# Patient Record
Sex: FEMALE | Race: WHITE | NOT HISPANIC OR LATINO | Employment: FULL TIME | URBAN - METROPOLITAN AREA
[De-identification: names, ages, dates, MRNs, and addresses within clinical notes are randomized per-mention and may not be internally consistent; named-entity substitution may affect disease eponyms.]

---

## 2020-08-10 PROBLEM — N92.1 MENORRHAGIA WITH IRREGULAR CYCLE: Status: ACTIVE | Noted: 2020-08-10

## 2020-08-10 NOTE — H&P (VIEW-ONLY)
Subjective:       Patient ID: Sarah Leach is a 39 y.o. female.    Chief Complaint:  Pre-op Exam      History of Present Illness  39-year-old  0-3 here today for a preoperative appointment.  Patient desires definitive treatment for her menorrhagia with D&C hysteroscopy NovaSure procedure.  Pathology was reviewed.  Ultrasound was reviewed.  Both noted to be within normal limits.  Patient was started on Lexapro.  She reports significant improvement in her mood.  She still complains of the problems with her bowels when having issues with anxiety.  She has weaned off of the Ativan to a dose of 0.5 mg p.o. q.6 hours.  We discussed a GI consultation.  Patient reports that the Levsin did not improve her symptoms.  Consent signed on chart.  Risks and benefits explained.  All questions were answered.      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, antepartum     Obesity complicating pregnancy      Past Surgical History:   Procedure Laterality Date     SECTION      CHOLECYSTECTOMY      SALPINGECTOMY      Right sided for ectopic pregnancy    TUBAL LIGATION       Social History     Tobacco Use    Smoking status: Former Smoker    Smokeless tobacco: Never Used   Substance Use Topics    Alcohol use: No    Drug use: Never          Current Outpatient Medications:     amLODIPine (NORVASC) 5 MG tablet, Take 5 mg by mouth once daily., Disp: , Rfl:     ergocalciferol (VITAMIN D2) 50,000 unit Cap, Take 1 capsule (50,000 Units total) by mouth every 7 days. for 12 doses, Disp: 12 capsule, Rfl: 0    escitalopram oxalate (LEXAPRO) 10 MG tablet, Take 1 tablet (10 mg total) by mouth once daily., Disp: 30 tablet, Rfl: 2    hydroCHLOROthiazide (HYDRODIURIL) 12.5 MG Tab, Take 12.5 mg by mouth once daily., Disp: , Rfl:     hyoscyamine (ANASPAZ,LEVSIN) 0.125 mg Tab, Take 1 tablet (125 mcg total) by mouth every 4  (four) hours as needed., Disp: 90 tablet, Rfl: 3    ketoconazole (NIZORAL) 2 % cream, Apply to affected area daily x 6 weeks, Disp: 30 g, Rfl: 1    LORazepam (ATIVAN) 2 MG Tab, Take 0.5 mg by mouth every 6 (six) hours as needed., Disp: , Rfl:     losartan (COZAAR) 50 MG tablet, Take 50 mg by mouth once daily., Disp: , Rfl:     metFORMIN (GLUCOPHAGE) 500 MG tablet, Take 500mg po BID x 1 week, then 1000mg po q hs and 500mg po q am x 1 week, then 1000mg PO BID, Disp: 120 tablet, Rfl: 11    rosuvastatin (CRESTOR) 5 MG tablet, Take 1 tablet (5 mg total) by mouth nightly., Disp: 30 tablet, Rfl: 11   Review of patient's allergies indicates:  No Known Allergies     GYN & OB History  No LMP recorded. (Menstrual status: Other).   Date of Last Pap: 7/15/2020    OB History    Para Term  AB Living   5 2 2   2 3   SAB TAB Ectopic Multiple Live Births   1   1   3      # Outcome Date GA Lbr Kingsley/2nd Weight Sex Delivery Anes PTL Lv   5  13        SIOBHAN   4 Ectopic 2012              Birth Comments: Required 3units PRBCs and right salpingectomy   3 Term  39w0d  3.997 kg (8 lb 13 oz) M CS-LTranv Spinal  SIOBHAN   2 SAB 2007           1 Term  39w0d  4.082 kg (9 lb) M CS-LTranv EPI  SIOBHAN       Review of Systems  Review of Systems   Constitutional: Negative for activity change, fatigue and unexpected weight change.   HENT: Negative for nasal congestion and tinnitus.    Eyes: Negative for discharge and visual disturbance.   Respiratory: Negative for cough, shortness of breath and wheezing.    Cardiovascular: Negative for chest pain, palpitations and leg swelling.   Gastrointestinal: Positive for abdominal pain and diarrhea. Negative for bloating, blood in stool, constipation, nausea, vomiting and reflux.   Endocrine: Negative for diabetes, hair loss, hot flashes, hyperthyroidism and hypothyroidism.   Genitourinary: Positive for dysmenorrhea, menorrhagia and menstrual problem. Negative for bladder  incontinence, decreased libido, dyspareunia, dysuria, frequency, genital sores, hematuria, hot flashes, pelvic pain, urgency, vaginal discharge, vaginal pain, urinary incontinence, postcoital bleeding and vaginal odor.   Musculoskeletal: Negative for arthralgias, joint swelling and myalgias.   Integumentary:  Negative for rash, acne, hair changes, mole/lesion, breast mass, nipple discharge, breast skin changes and breast tenderness.   Neurological: Negative for vertigo, seizures, syncope, numbness and headaches.   Hematological: Negative for adenopathy. Does not bruise/bleed easily.   Psychiatric/Behavioral: Negative for depression and sleep disturbance. The patient is nervous/anxious.    Breast: Negative for asymmetry, breast self exam, lump, mass, mastodynia, nipple discharge, skin changes and tenderness          Objective:    Physical Exam:   Constitutional: She is oriented to person, place, and time. She appears well-developed and well-nourished.    HENT:   Head: Normocephalic and atraumatic.   Nose: No epistaxis.    Eyes: EOM are normal.    Neck: Normal range of motion and full passive range of motion without pain. Neck supple.    Cardiovascular: Normal rate, regular rhythm and normal heart sounds.     Pulmonary/Chest: Effort normal and breath sounds normal.        Abdominal: Soft. Bowel sounds are normal.     Genitourinary:    Vagina and uterus normal.   There is no lesion on the right labia. There is no lesion on the left labia. Cervix is normal. Right adnexum displays no mass, no tenderness and no fullness. Left adnexum displays no mass, no tenderness and no fullness. Vaginal cuff normal.Cervix exhibits no motion tenderness.           Musculoskeletal: Normal range of motion.       Neurological: She is alert and oriented to person, place, and time. She has normal strength.    Skin: Skin is warm and dry.    Psychiatric: She has a normal mood and affect. Her speech is normal and behavior is normal.           Assessment:        1. Menorrhagia with irregular cycle    2. Preop testing               Plan:      Menorrhagia with irregular cycle  -     Case Request Operating Room: EXCISION, LEIOMYOMA, UTERUS, WITH DILATION AND CURETTAGE OF UTERUS, HYSTEROSCOPY, WITH DILATION AND CURETTAGE OF UTERUS  -     Pregnancy, urine rapid  -     X-Ray Chest 1 View Pre-OP; Future; Expected date: 08/10/2020  -     EKG 12-lead; Future    Preop testing  -     Pregnancy, urine rapid  -     X-Ray Chest 1 View Pre-OP; Future; Expected date: 08/10/2020  -     EKG 12-lead; Future  -     COVID-19 Routine Screening; Future; Expected date: 08/10/2020          Follow up in 2 days (on 8/12/2020) for D&C Hscope GTA.

## 2020-08-11 NOTE — PRE-PROCEDURE INSTRUCTIONS
1410- PT CALLED BACK . CONFUSED ABOUT TIME.OT DID NOT WANT TO COME TODAY AND DO EKG OR CXR. EKG AND CXR SCHEDULED FOR 0800 IN AM. SURGERY CHECK IN FOR 0900 TOMORROW. DISCUSSED PROCEDURE. COVID RAPID WILL BE DONE IN AM.

## 2020-08-12 ENCOUNTER — ANESTHESIA EVENT (OUTPATIENT)
Dept: SURGERY | Facility: HOSPITAL | Age: 40
End: 2020-08-12
Payer: COMMERCIAL

## 2020-08-12 ENCOUNTER — ANESTHESIA (OUTPATIENT)
Dept: SURGERY | Facility: HOSPITAL | Age: 40
End: 2020-08-12
Payer: COMMERCIAL

## 2020-08-12 ENCOUNTER — HOSPITAL ENCOUNTER (OUTPATIENT)
Dept: RADIOLOGY | Facility: HOSPITAL | Age: 40
Discharge: HOME | End: 2020-08-12
Attending: OBSTETRICS & GYNECOLOGY | Admitting: OBSTETRICS & GYNECOLOGY
Payer: COMMERCIAL

## 2020-08-12 ENCOUNTER — HOSPITAL ENCOUNTER (OUTPATIENT)
Dept: CARDIOLOGY | Facility: HOSPITAL | Age: 40
Discharge: HOME | End: 2020-08-12
Attending: OBSTETRICS & GYNECOLOGY | Admitting: OBSTETRICS & GYNECOLOGY
Payer: COMMERCIAL

## 2020-08-12 ENCOUNTER — HOSPITAL ENCOUNTER (OUTPATIENT)
Facility: HOSPITAL | Age: 40
Discharge: HOME | End: 2020-08-12
Attending: OBSTETRICS & GYNECOLOGY | Admitting: OBSTETRICS & GYNECOLOGY
Payer: COMMERCIAL

## 2020-08-12 DIAGNOSIS — N92.1 MENORRHAGIA WITH IRREGULAR CYCLE: ICD-10-CM

## 2020-08-12 DIAGNOSIS — N84.0 ENDOMETRIAL POLYP: Primary | ICD-10-CM

## 2020-08-12 DIAGNOSIS — E66.01 MORBID OBESITY: ICD-10-CM

## 2020-08-12 DIAGNOSIS — Z01.818 PREOP TESTING: ICD-10-CM

## 2020-08-12 LAB
POCT GLUCOSE: 107 MG/DL (ref 70–110)
SARS-COV-2 RDRP RESP QL NAA+PROBE: NEGATIVE

## 2020-08-12 PROCEDURE — 99900103 DSU ONLY-NO CHARGE-INITIAL HR (STAT)

## 2020-08-12 PROCEDURE — D9220A PRA ANESTHESIA: ICD-10-PCS | Mod: ,,, | Performed by: ANESTHESIOLOGY

## 2020-08-12 PROCEDURE — 63600175 PHARM REV CODE 636 W HCPCS: Performed by: NURSE ANESTHETIST, CERTIFIED REGISTERED

## 2020-08-12 PROCEDURE — 25000003 PHARM REV CODE 250

## 2020-08-12 PROCEDURE — 93005 ELECTROCARDIOGRAM TRACING: CPT

## 2020-08-12 PROCEDURE — 36000706: Performed by: OBSTETRICS & GYNECOLOGY

## 2020-08-12 PROCEDURE — 88305 TISSUE EXAM BY PATHOLOGIST: CPT | Performed by: PATHOLOGY

## 2020-08-12 PROCEDURE — U0002 COVID-19 LAB TEST NON-CDC: HCPCS

## 2020-08-12 PROCEDURE — 71045 XR CHEST 1 VIEW PRE-OP: ICD-10-PCS | Mod: 26,,, | Performed by: RADIOLOGY

## 2020-08-12 PROCEDURE — 25000003 PHARM REV CODE 250: Performed by: OBSTETRICS & GYNECOLOGY

## 2020-08-12 PROCEDURE — 71000015 HC POSTOP RECOV 1ST HR: Performed by: OBSTETRICS & GYNECOLOGY

## 2020-08-12 PROCEDURE — 63600175 PHARM REV CODE 636 W HCPCS: Performed by: ANESTHESIOLOGY

## 2020-08-12 PROCEDURE — 36000707: Performed by: OBSTETRICS & GYNECOLOGY

## 2020-08-12 PROCEDURE — 71045 X-RAY EXAM CHEST 1 VIEW: CPT | Mod: 26,,, | Performed by: RADIOLOGY

## 2020-08-12 PROCEDURE — 37000009 HC ANESTHESIA EA ADD 15 MINS: Performed by: OBSTETRICS & GYNECOLOGY

## 2020-08-12 PROCEDURE — 71000033 HC RECOVERY, INTIAL HOUR: Performed by: OBSTETRICS & GYNECOLOGY

## 2020-08-12 PROCEDURE — 37000008 HC ANESTHESIA 1ST 15 MINUTES: Performed by: OBSTETRICS & GYNECOLOGY

## 2020-08-12 PROCEDURE — 71045 X-RAY EXAM CHEST 1 VIEW: CPT | Mod: TC,FY

## 2020-08-12 PROCEDURE — D9220A PRA ANESTHESIA: Mod: ,,, | Performed by: ANESTHESIOLOGY

## 2020-08-12 PROCEDURE — S0020 INJECTION, BUPIVICAINE HYDRO: HCPCS | Performed by: OBSTETRICS & GYNECOLOGY

## 2020-08-12 PROCEDURE — 88305 TISSUE EXAM BY PATHOLOGIST: ICD-10-PCS | Mod: 26,,, | Performed by: PATHOLOGY

## 2020-08-12 PROCEDURE — C1782 MORCELLATOR: HCPCS | Performed by: OBSTETRICS & GYNECOLOGY

## 2020-08-12 PROCEDURE — 88305 TISSUE EXAM BY PATHOLOGIST: CPT | Mod: 26,,, | Performed by: PATHOLOGY

## 2020-08-12 PROCEDURE — S0028 INJECTION, FAMOTIDINE, 20 MG: HCPCS

## 2020-08-12 PROCEDURE — 27201423 OPTIME MED/SURG SUP & DEVICES STERILE SUPPLY: Performed by: OBSTETRICS & GYNECOLOGY

## 2020-08-12 RX ORDER — MORPHINE SULFATE 4 MG/ML
2 INJECTION, SOLUTION INTRAMUSCULAR; INTRAVENOUS
Status: CANCELLED | OUTPATIENT
Start: 2020-08-12

## 2020-08-12 RX ORDER — IBUPROFEN 800 MG/1
800 TABLET ORAL EVERY 8 HOURS PRN
Qty: 30 TABLET | Refills: 1 | Status: SHIPPED | OUTPATIENT
Start: 2020-08-12 | End: 2021-09-01

## 2020-08-12 RX ORDER — PROPOFOL 10 MG/ML
VIAL (ML) INTRAVENOUS
Status: DISCONTINUED | OUTPATIENT
Start: 2020-08-12 | End: 2020-08-12

## 2020-08-12 RX ORDER — ONDANSETRON 4 MG/1
8 TABLET, ORALLY DISINTEGRATING ORAL EVERY 8 HOURS PRN
Status: DISCONTINUED | OUTPATIENT
Start: 2020-08-12 | End: 2020-08-12 | Stop reason: HOSPADM

## 2020-08-12 RX ORDER — SODIUM CHLORIDE, SODIUM LACTATE, POTASSIUM CHLORIDE, CALCIUM CHLORIDE 600; 310; 30; 20 MG/100ML; MG/100ML; MG/100ML; MG/100ML
125 INJECTION, SOLUTION INTRAVENOUS CONTINUOUS
Status: DISCONTINUED | OUTPATIENT
Start: 2020-08-12 | End: 2020-08-12 | Stop reason: HOSPADM

## 2020-08-12 RX ORDER — SODIUM CHLORIDE, SODIUM LACTATE, POTASSIUM CHLORIDE, CALCIUM CHLORIDE 600; 310; 30; 20 MG/100ML; MG/100ML; MG/100ML; MG/100ML
INJECTION, SOLUTION INTRAVENOUS CONTINUOUS
Status: CANCELLED | OUTPATIENT
Start: 2020-08-12

## 2020-08-12 RX ORDER — SODIUM CHLORIDE 9 MG/ML
INJECTION, SOLUTION INTRAVENOUS CONTINUOUS
Status: DISCONTINUED | OUTPATIENT
Start: 2020-08-12 | End: 2020-08-12 | Stop reason: HOSPADM

## 2020-08-12 RX ORDER — DOXYCYCLINE HYCLATE 100 MG
100 TABLET ORAL 2 TIMES DAILY
Status: DISCONTINUED | OUTPATIENT
Start: 2020-08-12 | End: 2020-08-12 | Stop reason: HOSPADM

## 2020-08-12 RX ORDER — DIPHENHYDRAMINE HYDROCHLORIDE 50 MG/ML
25 INJECTION INTRAMUSCULAR; INTRAVENOUS EVERY 4 HOURS PRN
Status: CANCELLED | OUTPATIENT
Start: 2020-08-12

## 2020-08-12 RX ORDER — AMOXICILLIN 250 MG
1 CAPSULE ORAL 2 TIMES DAILY
COMMUNITY
Start: 2020-08-12 | End: 2021-09-01

## 2020-08-12 RX ORDER — IBUPROFEN 600 MG/1
600 TABLET ORAL EVERY 6 HOURS PRN
Status: CANCELLED | OUTPATIENT
Start: 2020-08-12

## 2020-08-12 RX ORDER — DOXYCYCLINE HYCLATE 100 MG
200 TABLET ORAL ONCE
Status: CANCELLED | OUTPATIENT
Start: 2020-08-12 | End: 2020-08-12

## 2020-08-12 RX ORDER — MEPERIDINE HYDROCHLORIDE 50 MG/ML
INJECTION INTRAMUSCULAR; INTRAVENOUS; SUBCUTANEOUS
Status: DISCONTINUED | OUTPATIENT
Start: 2020-08-12 | End: 2020-08-12

## 2020-08-12 RX ORDER — MORPHINE SULFATE 4 MG/ML
2 INJECTION, SOLUTION INTRAMUSCULAR; INTRAVENOUS EVERY 5 MIN PRN
Status: DISCONTINUED | OUTPATIENT
Start: 2020-08-12 | End: 2020-08-12 | Stop reason: HOSPADM

## 2020-08-12 RX ORDER — FAMOTIDINE 10 MG/ML
INJECTION INTRAVENOUS
Status: COMPLETED
Start: 2020-08-12 | End: 2020-08-12

## 2020-08-12 RX ORDER — OXYCODONE AND ACETAMINOPHEN 10; 325 MG/1; MG/1
1 TABLET ORAL EVERY 6 HOURS PRN
Qty: 14 TABLET | Refills: 0 | Status: SHIPPED | OUTPATIENT
Start: 2020-08-12 | End: 2021-09-01

## 2020-08-12 RX ORDER — MIDAZOLAM HYDROCHLORIDE 1 MG/ML
INJECTION, SOLUTION INTRAMUSCULAR; INTRAVENOUS
Status: DISCONTINUED | OUTPATIENT
Start: 2020-08-12 | End: 2020-08-12

## 2020-08-12 RX ORDER — DIPHENHYDRAMINE HYDROCHLORIDE 50 MG/ML
12.5 INJECTION INTRAMUSCULAR; INTRAVENOUS
Status: DISCONTINUED | OUTPATIENT
Start: 2020-08-12 | End: 2020-08-12 | Stop reason: HOSPADM

## 2020-08-12 RX ORDER — MORPHINE SULFATE 4 MG/ML
3 INJECTION, SOLUTION INTRAMUSCULAR; INTRAVENOUS
Status: CANCELLED | OUTPATIENT
Start: 2020-08-12

## 2020-08-12 RX ORDER — ONDANSETRON 2 MG/ML
4 INJECTION INTRAMUSCULAR; INTRAVENOUS DAILY PRN
Status: DISCONTINUED | OUTPATIENT
Start: 2020-08-12 | End: 2020-08-12 | Stop reason: HOSPADM

## 2020-08-12 RX ORDER — FAMOTIDINE 10 MG/ML
20 INJECTION INTRAVENOUS ONCE
Status: CANCELLED | OUTPATIENT
Start: 2020-08-12 | End: 2020-08-12

## 2020-08-12 RX ORDER — SODIUM CHLORIDE, SODIUM LACTATE, POTASSIUM CHLORIDE, CALCIUM CHLORIDE 600; 310; 30; 20 MG/100ML; MG/100ML; MG/100ML; MG/100ML
INJECTION, SOLUTION INTRAVENOUS CONTINUOUS PRN
Status: DISCONTINUED | OUTPATIENT
Start: 2020-08-12 | End: 2020-08-12

## 2020-08-12 RX ORDER — LIDOCAINE HYDROCHLORIDE 10 MG/ML
1 INJECTION, SOLUTION EPIDURAL; INFILTRATION; INTRACAUDAL; PERINEURAL ONCE
Status: CANCELLED | OUTPATIENT
Start: 2020-08-12 | End: 2020-08-12

## 2020-08-12 RX ORDER — DIPHENHYDRAMINE HCL 25 MG
25 CAPSULE ORAL EVERY 4 HOURS PRN
Status: CANCELLED | OUTPATIENT
Start: 2020-08-12

## 2020-08-12 RX ORDER — BUPIVACAINE HYDROCHLORIDE 5 MG/ML
INJECTION, SOLUTION EPIDURAL; INTRACAUDAL
Status: DISCONTINUED | OUTPATIENT
Start: 2020-08-12 | End: 2020-08-12 | Stop reason: HOSPADM

## 2020-08-12 RX ORDER — LIDOCAINE HYDROCHLORIDE 10 MG/ML
INJECTION INFILTRATION; PERINEURAL
Status: DISCONTINUED | OUTPATIENT
Start: 2020-08-12 | End: 2020-08-12 | Stop reason: HOSPADM

## 2020-08-12 RX ADMIN — Medication 25 MG: at 11:08

## 2020-08-12 RX ADMIN — PROPOFOL 100 MG: 10 INJECTION, EMULSION INTRAVENOUS at 10:08

## 2020-08-12 RX ADMIN — SODIUM CHLORIDE: 0.9 INJECTION, SOLUTION INTRAVENOUS at 09:08

## 2020-08-12 RX ADMIN — DOXYCYCLINE HYCLATE 100 MG: 100 TABLET, COATED ORAL at 09:08

## 2020-08-12 RX ADMIN — PROPOFOL 100 MG: 10 INJECTION, EMULSION INTRAVENOUS at 11:08

## 2020-08-12 RX ADMIN — MIDAZOLAM HYDROCHLORIDE 2 MG: 1 INJECTION, SOLUTION INTRAMUSCULAR; INTRAVENOUS at 10:08

## 2020-08-12 RX ADMIN — SODIUM CHLORIDE, POTASSIUM CHLORIDE, SODIUM LACTATE AND CALCIUM CHLORIDE: 600; 310; 30; 20 INJECTION, SOLUTION INTRAVENOUS at 10:08

## 2020-08-12 RX ADMIN — MORPHINE SULFATE 2 MG: 4 INJECTION INTRAVENOUS at 12:08

## 2020-08-12 RX ADMIN — ONDANSETRON HYDROCHLORIDE 4 MG: 2 SOLUTION INTRAMUSCULAR; INTRAVENOUS at 12:08

## 2020-08-12 RX ADMIN — FAMOTIDINE 20 MG: 10 INJECTION INTRAVENOUS at 09:08

## 2020-08-12 RX ADMIN — Medication 50 MG: at 10:08

## 2020-08-12 NOTE — BRIEF OP NOTE
[facility] - Periop Services  Brief Operative Note    Surgery Date: 8/12/2020     Surgeon(s) and Role:     * Rashel Mccoy MD - Primary    Assisting Surgeon: None    Pre-op Diagnosis:  Menorrhagia with irregular cycle [N92.1]    Post-op Diagnosis:  Post-Op Diagnosis Codes:     * Menorrhagia with irregular cycle [N92.1]    Procedure(s) (LRB):  HYSTEROSCOPY, THERAPEUTIC (N/A)  HYSTEROSCOPY, WITH DILATION AND CURETTAGE OF UTERUS (N/A)  POLYPECTOMY, UTERUS, HYSTEROSCOPIC (N/A)    Anesthesia: Choice    Description of the findings of the procedure(s):  Patient sounded to 12 cm.  She had a cervical length of 8 cm.  She had a cavity length of 4 cm.  She had a cavity width of 4.6 cm.  She had a power of 101 w.  In a burn time of 1 min 44 sec.  There was a 135 cc fluid deficit.  The patient had a shaggy endometrium.  Both ostia were viewed.  There was an endometrial polyp located in the lower uterine segment.    Estimated Blood Loss: * 25cc       Specimens:  Endometrium        Discharge Note    OUTCOME: Patient tolerated treatment/procedure well without complication and is now ready for discharge.    DISPOSITION: Home or Self Care    FINAL DIAGNOSIS:  <principal problem not specified>    FOLLOWUP: In clinic    DISCHARGE INSTRUCTIONS:    Discharge Procedure Orders   Diet general     Call MD for:  temperature >100.4     Call MD for:  persistent nausea and vomiting     Call MD for:  severe uncontrolled pain     Call MD for:  difficulty breathing, headache or visual disturbances     Call MD for:  redness, tenderness, or signs of infection (pain, swelling, redness, odor or green/yellow discharge around incision site)

## 2020-08-12 NOTE — PLAN OF CARE
PT TO RECOVERY FROM OR , PT CONNECTED TO MONITOR, IV INTACT,AND INFUSING, ,VITALS STABLE, WILL CONTINUE TO MONITOR

## 2020-08-12 NOTE — ANESTHESIA POSTPROCEDURE EVALUATION
Anesthesia Post Evaluation    Patient: Sarah Leach    Procedure(s) Performed: Procedure(s) (LRB):  HYSTEROSCOPY, THERAPEUTIC (N/A)  HYSTEROSCOPY, WITH DILATION AND CURETTAGE OF UTERUS (N/A)  POLYPECTOMY, UTERUS, HYSTEROSCOPIC (N/A)  ABLATION, ENDOMETRIUM, THERMAL, HYSTEROSCOPIC (N/A)    Final Anesthesia Type: general    Patient location during evaluation: PACU  Patient participation: Yes- Able to Participate  Level of consciousness: awake and alert  Post-procedure vital signs: reviewed and stable  Pain management: adequate  Airway patency: patent    PONV status at discharge: No PONV  Anesthetic complications: no      Cardiovascular status: blood pressure returned to baseline  Respiratory status: unassisted  Hydration status: euvolemic  Follow-up not needed.          Vitals Value Taken Time   /78 08/12/20 1223   Temp 37 °C (98.6 °F) 08/12/20 1130   Pulse 71 08/12/20 1224   Resp 10 08/12/20 1223   SpO2 99 % 08/12/20 1224   Vitals shown include unvalidated device data.      Event Time   Out of Recovery 11:45:00         Pain/Elma Score: Pain Rating Prior to Med Admin: 7 (8/12/2020 12:09 PM)  Pain Rating Post Med Admin: 4 (8/12/2020 12:09 PM)  Elma Score: 10 (8/12/2020 12:09 PM)

## 2020-08-12 NOTE — TRANSFER OF CARE
"Anesthesia Transfer of Care Note    Patient: Sarah Leach    Procedure(s) Performed: Procedure(s) (LRB):  HYSTEROSCOPY, THERAPEUTIC (N/A)  HYSTEROSCOPY, WITH DILATION AND CURETTAGE OF UTERUS (N/A)  POLYPECTOMY, UTERUS, HYSTEROSCOPIC (N/A)    Patient location: PACU    Anesthesia Type: MAC    Transport from OR: Transported from OR on room air with adequate spontaneous ventilation    Post pain: adequate analgesia    Post assessment: no apparent anesthetic complications    Post vital signs: stable    Level of consciousness: responds to stimulation, awake, alert and oriented    Nausea/Vomiting: no nausea/vomiting    Complications: none    Transfer of care protocol was followed      Last vitals:   Visit Vitals  /80 (BP Location: Right arm, Patient Position: Lying)   Pulse 76   Temp 37 °C (98.6 °F) (Oral)   Resp 18   Ht 5' 6" (1.676 m)   Wt 125.2 kg (276 lb)   SpO2 99%   Breastfeeding No   BMI 44.55 kg/m²     "

## 2020-08-12 NOTE — OP NOTE
[facility] - Periop Services  Operative Note     SUMMARY     Surgery Date: 8/12/2020     Procedure Performed By: Rashel Mccoy MD    Procedure Performed:  D&C Hysteroscopy with Myosure & Novasure    Anesthesia:  IV/MAC    Assisted By: MONSE    Pre-op Diagnosis:  Menorrhagia with irregular cycle [N92.1]    Post-op Diagnosis:  Post-Op Diagnosis Codes:     * Menorrhagia with irregular cycle [N92.1]     Estimated Blood Loss: * 25cc  Complications: none    Specimen: endometrium        Description of the findings of the procedure(s):  Patient sounded to 12 cm.  She had a cervical length of 8 cm.  She had a cavity length of 4 cm.  She had a cavity width of 4.6 cm.  She had a power of 101 w.  In a burn time of 1 min 44 sec.  There was a 135 cc fluid deficit.  The patient had a shaggy endometrium.  Both ostia were viewed.  There was an endometrial polyp located in the lower uterine segment          Procedure in Detail: After ensuring informed consent, the patient was taken to the operating room where general anesthesia was initiated. A time out was performed with the O.R. crew. She was placed in the adjustable Joel stirrups. Her perineum was prepped and draped in the usual sterile fashion. The anterior lip of the cervix was grasped with a single- toothed tenaculum. The uterus was sounded to the above stated length. The patient was gently dilated to the highest dilatation with the Hanks dilators. The hysteroscope was then placed inside the patients uterus, and inspection of the patients uterus revealed the above findings. The Myosure device was placed inside the uterine cavity. The endometrial pathology was removed. The hysteroscope was removed.  Next, the Novasure device was placed. The instrument was primed, and the ablation proceeded for the above stated time without any complications.  The Novasure device was removed and a repeat hysteroscopy was performed, which revealed an adequate burn with no  evidence of any perforations. At this point, the case was concluded. All instruments were removed from the patients vagina. She was taken out of the adjustable Joel stirrups and placed in the supine position. She was awakened from anesthesia and taken to the recovery room in stable condition.  All counts were correct x 2. The patient tolerated the procedure well.

## 2020-08-12 NOTE — ANESTHESIA PREPROCEDURE EVALUATION
08/12/2020  Sarah Leach is a 40 y.o., female.    Anesthesia Evaluation    I have reviewed the Patient Summary Reports.    I have reviewed the Nursing Notes. I have reviewed the NPO Status.   I have reviewed the Medications.     Review of Systems  Social:  Former Smoker    Hematology/Oncology:  Hematology Normal   Oncology Normal     EENT/Dental:EENT/Dental Normal   Cardiovascular:   Hypertension    Pulmonary:  Pulmonary Normal    Renal/:  Renal/ Normal     Hepatic/GI:  Hepatic/GI Normal    Musculoskeletal:  Musculoskeletal Normal    Neurological:  Neurology Normal    Endocrine:   Diabetes    Dermatological:  Skin Normal    Psych:  Psychiatric Normal           Physical Exam  General:  Well nourished, Morbid Obesity    Airway/Jaw/Neck:  Airway Findings: Mouth Opening: Normal Tongue: Normal  General Airway Assessment: Adult  Mallampati: III  Improves to II with phonation.  TM Distance: 4 - 6 cm       Chest/Lungs:  Chest/Lungs Findings: Clear to auscultation     Heart/Vascular:  Heart Findings: Rate: Normal  Rhythm: Regular Rhythm        Mental Status:  Mental Status Findings:  Cooperative, Alert and Oriented         Anesthesia Plan  Type of Anesthesia, risks & benefits discussed:  Anesthesia Type:  general  Patient's Preference:   Intra-op Monitoring Plan: standard ASA monitors  Intra-op Monitoring Plan Comments:   Post Op Pain Control Plan: IV/PO Opioids PRN  Post Op Pain Control Plan Comments:   Induction:   IV  Beta Blocker:  Patient is not currently on a Beta-Blocker (No further documentation required).       Informed Consent: Patient understands risks and agrees with Anesthesia plan.  Questions answered. Anesthesia consent signed with patient.  ASA Score: 3     Day of Surgery Review of History & Physical: I have interviewed and examined the patient. I have reviewed the patient's H&P dated:             Ready For Surgery From Anesthesia Perspective.

## 2020-08-17 VITALS
TEMPERATURE: 99 F | SYSTOLIC BLOOD PRESSURE: 126 MMHG | WEIGHT: 276 LBS | RESPIRATION RATE: 18 BRPM | HEIGHT: 66 IN | BODY MASS INDEX: 44.36 KG/M2 | HEART RATE: 63 BPM | OXYGEN SATURATION: 100 % | DIASTOLIC BLOOD PRESSURE: 79 MMHG

## 2020-08-17 LAB
FINAL PATHOLOGIC DIAGNOSIS: NORMAL
GROSS: NORMAL

## 2021-06-02 ENCOUNTER — HOSPITAL ENCOUNTER (EMERGENCY)
Facility: HOSPITAL | Age: 41
Discharge: HOME | End: 2021-06-02
Payer: COMMERCIAL

## 2021-06-02 VITALS
WEIGHT: 256 LBS | RESPIRATION RATE: 18 BRPM | HEART RATE: 99 BPM | OXYGEN SATURATION: 96 % | HEIGHT: 66 IN | SYSTOLIC BLOOD PRESSURE: 123 MMHG | BODY MASS INDEX: 41.14 KG/M2 | DIASTOLIC BLOOD PRESSURE: 69 MMHG | TEMPERATURE: 99 F

## 2021-06-02 DIAGNOSIS — Z77.098 CHEMICAL EXPOSURE: ICD-10-CM

## 2021-06-02 DIAGNOSIS — R53.1 WEAKNESS: Primary | ICD-10-CM

## 2021-06-02 LAB
ANION GAP SERPL CALC-SCNC: 12 MMOL/L (ref 8–16)
BASOPHILS # BLD AUTO: 0.06 K/UL (ref 0–0.2)
BASOPHILS NFR BLD: 0.6 % (ref 0–1.9)
BUN SERPL-MCNC: 8 MG/DL (ref 6–20)
CALCIUM SERPL-MCNC: 9.4 MG/DL (ref 8.7–10.5)
CHLORIDE SERPL-SCNC: 103 MMOL/L (ref 95–110)
CO2 SERPL-SCNC: 23 MMOL/L (ref 23–29)
CREAT SERPL-MCNC: 0.7 MG/DL (ref 0.5–1.4)
DIFFERENTIAL METHOD: ABNORMAL
EOSINOPHIL # BLD AUTO: 0.4 K/UL (ref 0–0.5)
EOSINOPHIL NFR BLD: 3.7 % (ref 0–8)
ERYTHROCYTE [DISTWIDTH] IN BLOOD BY AUTOMATED COUNT: 12.6 % (ref 11.5–14.5)
EST. GFR  (AFRICAN AMERICAN): >60 ML/MIN/1.73 M^2
EST. GFR  (NON AFRICAN AMERICAN): >60 ML/MIN/1.73 M^2
GLUCOSE SERPL-MCNC: 100 MG/DL (ref 70–110)
HCT VFR BLD AUTO: 40.7 % (ref 37–48.5)
HGB BLD-MCNC: 14.3 G/DL (ref 12–16)
IMM GRANULOCYTES # BLD AUTO: 0.03 K/UL (ref 0–0.04)
IMM GRANULOCYTES NFR BLD AUTO: 0.3 % (ref 0–0.5)
INR PPP: 1 (ref 0.8–1.2)
LYMPHOCYTES # BLD AUTO: 1.5 K/UL (ref 1–4.8)
LYMPHOCYTES NFR BLD: 14.8 % (ref 18–48)
MCH RBC QN AUTO: 30 PG (ref 27–31)
MCHC RBC AUTO-ENTMCNC: 35.1 G/DL (ref 32–36)
MCV RBC AUTO: 86 FL (ref 82–98)
MONOCYTES # BLD AUTO: 0.5 K/UL (ref 0.3–1)
MONOCYTES NFR BLD: 5.1 % (ref 4–15)
NEUTROPHILS # BLD AUTO: 7.4 K/UL (ref 1.8–7.7)
NEUTROPHILS NFR BLD: 75.5 % (ref 38–73)
NRBC BLD-RTO: 0 /100 WBC
PLATELET # BLD AUTO: 393 K/UL (ref 150–450)
PMV BLD AUTO: 9.6 FL (ref 9.2–12.9)
POTASSIUM SERPL-SCNC: 3.6 MMOL/L (ref 3.5–5.1)
PROTHROMBIN TIME: 10.5 SEC (ref 9–12.5)
RBC # BLD AUTO: 4.76 M/UL (ref 4–5.4)
SODIUM SERPL-SCNC: 138 MMOL/L (ref 136–145)
TROPONIN I SERPL DL<=0.01 NG/ML-MCNC: <0.006 NG/ML (ref 0–0.03)
WBC # BLD AUTO: 9.81 K/UL (ref 3.9–12.7)

## 2021-06-02 PROCEDURE — 96366 THER/PROPH/DIAG IV INF ADDON: CPT

## 2021-06-02 PROCEDURE — 71045 X-RAY EXAM CHEST 1 VIEW: CPT | Mod: TC,FY

## 2021-06-02 PROCEDURE — 96365 THER/PROPH/DIAG IV INF INIT: CPT

## 2021-06-02 PROCEDURE — 25000003 PHARM REV CODE 250: Performed by: NURSE PRACTITIONER

## 2021-06-02 PROCEDURE — 80048 BASIC METABOLIC PNL TOTAL CA: CPT | Performed by: NURSE PRACTITIONER

## 2021-06-02 PROCEDURE — 85025 COMPLETE CBC W/AUTO DIFF WBC: CPT | Performed by: NURSE PRACTITIONER

## 2021-06-02 PROCEDURE — 84484 ASSAY OF TROPONIN QUANT: CPT | Performed by: NURSE PRACTITIONER

## 2021-06-02 PROCEDURE — 93005 ELECTROCARDIOGRAM TRACING: CPT

## 2021-06-02 PROCEDURE — 71045 X-RAY EXAM CHEST 1 VIEW: CPT | Mod: 26,,, | Performed by: RADIOLOGY

## 2021-06-02 PROCEDURE — 63600175 PHARM REV CODE 636 W HCPCS: Performed by: NURSE PRACTITIONER

## 2021-06-02 PROCEDURE — 71045 XR CHEST AP PORTABLE: ICD-10-PCS | Mod: 26,,, | Performed by: RADIOLOGY

## 2021-06-02 PROCEDURE — 99284 EMERGENCY DEPT VISIT MOD MDM: CPT | Mod: 25

## 2021-06-02 PROCEDURE — 85610 PROTHROMBIN TIME: CPT | Performed by: NURSE PRACTITIONER

## 2021-06-02 RX ORDER — CIPROFLOXACIN 500 MG/1
500 TABLET ORAL 2 TIMES DAILY
Qty: 20 TABLET | Refills: 1 | Status: SHIPPED | OUTPATIENT
Start: 2021-06-02 | End: 2021-06-12

## 2021-06-02 RX ORDER — NAPROXEN SODIUM 220 MG/1
81 TABLET, FILM COATED ORAL DAILY
COMMUNITY

## 2021-06-02 RX ORDER — SODIUM CHLORIDE 9 MG/ML
INJECTION, SOLUTION INTRAVENOUS
Status: COMPLETED | OUTPATIENT
Start: 2021-06-02 | End: 2021-06-02

## 2021-06-02 RX ORDER — LEVOFLOXACIN 5 MG/ML
750 INJECTION, SOLUTION INTRAVENOUS
Status: COMPLETED | OUTPATIENT
Start: 2021-06-02 | End: 2021-06-02

## 2021-06-02 RX ADMIN — SODIUM CHLORIDE: 0.9 INJECTION, SOLUTION INTRAVENOUS at 01:06

## 2021-06-02 RX ADMIN — LEVOFLOXACIN 750 MG: 750 INJECTION, SOLUTION INTRAVENOUS at 01:06

## 2022-10-14 ENCOUNTER — HOSPITAL ENCOUNTER (OUTPATIENT)
Dept: RADIOLOGY | Facility: HOSPITAL | Age: 42
Discharge: HOME | End: 2022-10-14
Attending: PHYSICIAN ASSISTANT
Payer: COMMERCIAL

## 2022-10-14 VITALS — WEIGHT: 275.56 LBS | HEIGHT: 66 IN | BODY MASS INDEX: 44.29 KG/M2

## 2022-10-14 DIAGNOSIS — Z12.31 ENCOUNTER FOR SCREENING MAMMOGRAM FOR BREAST CANCER: ICD-10-CM

## 2022-10-14 PROCEDURE — 77063 MAMMO DIGITAL SCREENING BILAT WITH TOMO: ICD-10-PCS | Mod: 26,,, | Performed by: RADIOLOGY

## 2022-10-14 PROCEDURE — 77063 BREAST TOMOSYNTHESIS BI: CPT | Mod: 26,,, | Performed by: RADIOLOGY

## 2022-10-14 PROCEDURE — 77067 MAMMO DIGITAL SCREENING BILAT WITH TOMO: ICD-10-PCS | Mod: 26,,, | Performed by: RADIOLOGY

## 2022-10-14 PROCEDURE — 77067 SCR MAMMO BI INCL CAD: CPT | Mod: 26,,, | Performed by: RADIOLOGY

## 2022-10-14 PROCEDURE — 77063 BREAST TOMOSYNTHESIS BI: CPT | Mod: TC

## 2022-10-14 PROCEDURE — 77067 SCR MAMMO BI INCL CAD: CPT | Mod: TC

## 2022-12-05 PROBLEM — E11.9 CONTROLLED TYPE 2 DIABETES MELLITUS WITHOUT COMPLICATION, WITHOUT LONG-TERM CURRENT USE OF INSULIN: Status: ACTIVE | Noted: 2022-12-05

## 2023-01-17 PROBLEM — E11.65 TYPE 2 DIABETES MELLITUS WITH HYPERGLYCEMIA, WITHOUT LONG-TERM CURRENT USE OF INSULIN: Status: ACTIVE | Noted: 2022-12-05

## 2023-01-17 PROBLEM — I10 HYPERTENSION: Status: ACTIVE | Noted: 2023-01-17

## 2024-05-15 ENCOUNTER — HOSPITAL ENCOUNTER (OUTPATIENT)
Dept: RADIOLOGY | Facility: HOSPITAL | Age: 44
Discharge: HOME | End: 2024-05-15
Attending: PODIATRIST
Payer: COMMERCIAL

## 2024-05-15 ENCOUNTER — OFFICE VISIT (OUTPATIENT)
Dept: PODIATRY | Facility: CLINIC | Age: 44
End: 2024-05-15
Payer: COMMERCIAL

## 2024-05-15 VITALS
WEIGHT: 251 LBS | SYSTOLIC BLOOD PRESSURE: 143 MMHG | HEIGHT: 66 IN | DIASTOLIC BLOOD PRESSURE: 89 MMHG | HEART RATE: 64 BPM | BODY MASS INDEX: 40.34 KG/M2

## 2024-05-15 DIAGNOSIS — M79.671 FOOT PAIN, RIGHT: ICD-10-CM

## 2024-05-15 DIAGNOSIS — E11.9 TYPE 2 DIABETES MELLITUS WITHOUT COMPLICATION, WITHOUT LONG-TERM CURRENT USE OF INSULIN: ICD-10-CM

## 2024-05-15 DIAGNOSIS — M72.2 PLANTAR FASCIITIS: Primary | ICD-10-CM

## 2024-05-15 DIAGNOSIS — E11.9 COMPREHENSIVE DIABETIC FOOT EXAMINATION, TYPE 2 DM, ENCOUNTER FOR: ICD-10-CM

## 2024-05-15 PROCEDURE — 99999 PR PBB SHADOW E&M-EST. PATIENT-LVL IV: CPT | Mod: PBBFAC,,, | Performed by: PODIATRIST

## 2024-05-15 PROCEDURE — 73630 X-RAY EXAM OF FOOT: CPT | Mod: 26,RT,, | Performed by: RADIOLOGY

## 2024-05-15 PROCEDURE — 99203 OFFICE O/P NEW LOW 30 MIN: CPT | Mod: S$GLB,,, | Performed by: PODIATRIST

## 2024-05-15 PROCEDURE — 73630 X-RAY EXAM OF FOOT: CPT | Mod: TC,RT

## 2024-05-15 RX ORDER — DICLOFENAC SODIUM 75 MG/1
75 TABLET, DELAYED RELEASE ORAL 2 TIMES DAILY
Qty: 60 TABLET | Refills: 0 | Status: SHIPPED | OUTPATIENT
Start: 2024-05-15 | End: 2024-06-14

## 2024-05-18 PROBLEM — M72.2 PLANTAR FASCIITIS: Status: ACTIVE | Noted: 2024-05-18

## 2024-05-18 NOTE — PROGRESS NOTES
Subjective:       Patient ID: Sarah Leach is a 43 y.o. female.    Chief Complaint: Heel Pain (Right ) and Foot Swelling (Right heel )  Patient presents today for a new patient evaluation patient is a type 2 diabetic x2 years and states she is well controlled.  Patient has taken ibuprofen to address right heel pain which has helped a little bit she states she has had some redness some inflammation and swelling on and off but this has been bothering her for about 8 months.  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  Patient relates her right foot is definitely worse when she is barefoot.    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, affecting management of mother, antepartum     Obesity complicating pregnancy      Past Surgical History:   Procedure Laterality Date     SECTION      CHOLECYSTECTOMY      HYSTEROSCOPIC POLYPECTOMY OF UTERUS N/A 2020    Procedure: POLYPECTOMY, UTERUS, HYSTEROSCOPIC;  Surgeon: Rashel Mccoy MD;  Location: [facility] OR;  Service: OB/GYN;  Laterality: N/A;    HYSTEROSCOPIC SURGICAL PROCEDURE N/A 2020    Procedure: HYSTEROSCOPY, THERAPEUTIC;  Surgeon: Rashel Mccoy MD;  Location: [facility] OR;  Service: OB/GYN;  Laterality: N/A;  myosure    HYSTEROSCOPY WITH DILATION AND CURETTAGE OF UTERUS N/A 2020    Procedure: HYSTEROSCOPY, WITH DILATION AND CURETTAGE OF UTERUS;  Surgeon: Rashel Mccoy MD;  Location: [facility] OR;  Service: OB/GYN;  Laterality: N/A;    SALPINGECTOMY      Right sided for ectopic pregnancy    THERMAL ABLATION OF ENDOMETRIUM USING HYSTEROSCOPY N/A  8/12/2020    Procedure: ABLATION, ENDOMETRIUM, THERMAL, HYSTEROSCOPIC;  Surgeon: Rashel Mccoy MD;  Location: [facility] OR;  Service: OB/GYN;  Laterality: N/A;    TUBAL LIGATION       Family History   Problem Relation Name Age of Onset    Hypertension Mother      Breast cancer Maternal Aunt      Diabetes Maternal Grandfather      Heart attack Paternal Grandfather       Social History     Socioeconomic History    Marital status:    Tobacco Use    Smoking status: Former    Smokeless tobacco: Never   Substance and Sexual Activity    Alcohol use: No    Drug use: Never    Sexual activity: Yes     Partners: Male     Birth control/protection: See Surgical Hx   Social History Narrative    Plans from [facility]        GYN Visit & F/U4/8/2013     Postoperative and infection.  Return to clinic in 10 days to check incision.  Cipro 500 mg 1 by mouth twice a day #14     GYN Visit & F/U4/4/2013     Postoperative and infection apparent seroma.  Awaiting cultures.  Shower with Hibiclens daily at bedtime.  Return to clinic in one week.     GYN Visit & F/U4/3/2013     Postoperative infection: A&A cultures performed.  Ultrasound of abdominal incision to ensure no large subcutaneous abscess     GYN Exam (Annual/6Wk PP)104/1/2013     6 weeks postpartum visit.  Chronic hypertension.  Wound infection.  Obesity.    Return to clinic on 4/25/13 to recheck blood pressure.  Keep blood pressure log book.  Hold Procardia and propranolol for now.  Diet and exercise.     GYN Visit & F/U3/25/2013     Postoperative and infection.  Migraines.  Hypertension.    Will try to discontinue Procardia and started patient on propanolol.  Hold Procardia for now.  Refill on Bactrim given.  Return to clinic in one week.  Patient to keep blood pressure log book.     GYN Visit & F/U3/18/2013     Postpartal migraines.  Wound abscess.    Culture performed.  Neurology consult.  Continue Procardia for now  Return to clinic in 1 week to check incision.   Prescription for Bactrim DS #14.     GYN Visit & F/U3/13/2013     co ha w visual distrubances...sp cs 3 wk, w pp suspected pih..on valuim and procardia....sp lasiz x 2 d....0-tender over left frontal sinus....inc cl..dtr 3/3  ..legs 2+ edema.    a-pih    p-admit.     GYN Visit & F/U3/12/2013     Possible postpartum preeclampsia.  Prescription for Valium 10 mg every 8 hours, #20 prescription for Procardia 60 mg XL 1 by mouth twice a day #60.  Prescription for Lasix 40 mg by mouth daily.  Return to clinic in 1 week to check blood pressure.  PIH precautions given.     GYN Visit & F/U3/8/2013     Postpartal gestational hypertension.  Postpartal edema.    SMA-7 today to check.  Creatinine and potassium.  Return to clinic on Tuesday to check blood pressure.  Continue Lasix for now.     GYN Visit & F/U3/1/2013     Postop.  Postpartal edema.  Return to clinic in 1 week to check edema and blood pressure.     GYN Visit & F/U2/26/2013     Edema.  Postop.    Increase by mouth hydration.  Will hold Lasix for now secondary to breast-feeding.  We'll watch for rash patient is returning to clinic for circumcision on Thursday, and will follow up with blood pressure and check edema.     OB Visit 20162/18/2013     See OB chart     OB Visit 20162/14/2013     See OB chart     OB Visit 20162/11/2013     See OB chart     OB Visit 20162/5/2013     See OB chart     OB Visit 20161/28/2013     See OB chart     OB Visit 20161/14/2013     See OB chart     OB Visit 20161/7/2013     See OB chart     OB Visit 201612/17/2012     See OB chart     OB Visit 201612/6/2012     See OB chart     OB Visit 201611/6/2012     See OB chart     OB Visit 201610/22/2012     See OB chart     OB Visit 20169/13/2012     See OB chart     OB Admit 108/9/2012     10/1 weeks gestation    Pelvic/ob bloodwork done        Plan:    Return for follow up appointment in 5 weeks for follow up ob and Serum part II     OB Appt7/24/2012     Return for follow up appointment 2-3  "weeks for pelvic/bloodwork.    Also schedule paperwork appt.     OB Appt7/16/2012     threat ab vs early iup    ho ep    hcg 4.2k/5.9k, prog19    us fht pos edc 3/7 correlates w lmp edc 3/6     OB Appt7/9/2012     Beta HCG and Progesterone today.    Repeat beta in 48hrs.    If levels questionable, TVUS at [facility] per Dr Contreras this week,    Return for follow up appointment one week to repeat TVUS.     GYN Exam (Annual/6Wk PP)103/29/2012     Return to clinic in a.m. for blood work to include CBC fasting blood sugar and lipid panel also will check TSH and prolactin secondary to galactorrhea.    Continue NuvaRing    Return to clinic in 1 year for annual    Pap of left breast fluid obtained       Current Outpatient Medications   Medication Sig Dispense Refill    aspirin 81 MG Chew Take 81 mg by mouth once daily.      busPIRone (BUSPAR) 10 MG tablet TAKE 1 TABLET (10 MG TOTAL) BY MOUTH 3 (THREE) TIMES DAILY. 90 tablet 11    empagliflozin (JARDIANCE) 10 mg tablet Take 1 tablet (10 mg total) by mouth once daily. 30 tablet 11    rosuvastatin (CRESTOR) 5 MG tablet TAKE ONE TABLET BY MOUTH NIGHTLY 90 tablet 3    tirzepatide (MOUNJARO) 5 mg/0.5 mL PnIj Inject 5 mg into the skin every 7 days. 6 mL 0    diclofenac (VOLTAREN) 75 MG EC tablet Take 1 tablet (75 mg total) by mouth 2 (two) times daily. 60 tablet 0    tirzepatide 7.5 mg/0.5 mL PnIj Inject 7.5 mg into the skin every 7 days. (Patient not taking: Reported on 5/15/2024) 2 mL 1     No current facility-administered medications for this visit.     Review of patient's allergies indicates:   Allergen Reactions    Fig Itching and Swelling    Meloxicam Hives and Itching       Review of Systems   Musculoskeletal:  Positive for arthralgias.   All other systems reviewed and are negative.      Objective:      Vitals:    05/15/24 1116   BP: (!) 143/89   BP Location: Left arm   Patient Position: Sitting   Pulse: 64   Weight: 113.9 kg (251 lb)   Height: 5' 6" (1.676 m)     Physical " Exam  Vitals and nursing note reviewed.   Constitutional:       Appearance: Normal appearance.   Cardiovascular:      Pulses:           Dorsalis pedis pulses are 2+ on the right side and 2+ on the left side.        Posterior tibial pulses are 1+ on the right side and 1+ on the left side.   Pulmonary:      Effort: Pulmonary effort is normal.   Musculoskeletal:         General: Tenderness present.        Feet:    Feet:      Right foot:      Protective Sensation: 3 sites tested.  3 sites sensed.      Skin integrity: Erythema and warmth present.      Left foot:      Protective Sensation: 3 sites tested.  3 sites sensed.   Skin:     Capillary Refill: Capillary refill takes less than 2 seconds.      Findings: Erythema present.   Neurological:      General: No focal deficit present.      Mental Status: She is alert.   Psychiatric:         Mood and Affect: Mood normal.         Behavior: Behavior normal.                         Assessment:       1. Plantar fasciitis    2. Foot pain, right    3. Type 2 diabetes mellitus without complication, without long-term current use of insulin    4. Comprehensive diabetic foot examination, type 2 DM, encounter for        Plan:       Patient presents today for a new patient evaluation patient is a type 2 diabetic x2 years and states she is well controlled.  Patient has taken ibuprofen to address right heel pain which has helped a little bit she states she has had some redness some inflammation and swelling on and off but this has been bothering her for about 8 months.  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  Patient relates her right foot is definitely worse when she is barefoot.  A comprehensive new patient diabetic evaluation was  performed today patient has no skin breaks no active signs of infection her protective sensation is noted to be intact bilateral.  Patient does have findings consistent with plantar fasciitis the majority of the patient's pain is medial to lateral compression of the patient's right heel x-rays were taken today no fractures no signs of dislocation noted.  Patient does have notable plantar and posterior calcaneal exostosis.  I did explain to the patient that she has findings consistent with plantar fasciitis which I discussed at length and in detail I advised the patient she needs to make sure she is wearing good supportive shoes at all times of weight-bearing if she has not getting appropriate support because she does have elevated arches it is putting excessive pull stress and pressure on the plantar fascial ligament especially at the insertion which is causing the right heel pain.  Patient will discontinue the ibuprofen I have started her on diclofenac I have recommended Oofos as a good option for the patient to wear in the house I have also dispensed the patient some small blue arch pads which I want her to use at all times I have given her additional and showed wear these need to go in her shoes.  Patient will certainly not go barefoot she has already not doing that I do plan to follow up with her in about 3 weeks for re-evaluation we will see how she is doing and make a determination as to whether not we need to give her more support depending upon how well she is tolerating the blue pads.This note was created using [employer] voice recognition software that occasionally misinterpreted phrases or words.

## 2024-06-05 ENCOUNTER — OFFICE VISIT (OUTPATIENT)
Dept: PODIATRY | Facility: CLINIC | Age: 44
End: 2024-06-05
Payer: COMMERCIAL

## 2024-06-05 VITALS
HEIGHT: 66 IN | HEART RATE: 75 BPM | DIASTOLIC BLOOD PRESSURE: 90 MMHG | BODY MASS INDEX: 40.18 KG/M2 | WEIGHT: 250 LBS | SYSTOLIC BLOOD PRESSURE: 136 MMHG

## 2024-06-05 DIAGNOSIS — M72.2 PLANTAR FASCIITIS: Primary | ICD-10-CM

## 2024-06-05 PROCEDURE — 99214 OFFICE O/P EST MOD 30 MIN: CPT | Mod: S$GLB,,, | Performed by: PODIATRIST

## 2024-06-05 PROCEDURE — 99999 PR PBB SHADOW E&M-EST. PATIENT-LVL III: CPT | Mod: PBBFAC,,, | Performed by: PODIATRIST

## 2024-06-05 NOTE — PROGRESS NOTES
Subjective:       Patient ID: Sarah Leach is a 43 y.o. female.    Chief Complaint: Plantar Fasciitis and Follow-up    Patient presents today for follow-up plantar fasciitis right foot.  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, affecting management of mother, antepartum     Obesity complicating pregnancy      Past Surgical History:   Procedure Laterality Date     SECTION      CHOLECYSTECTOMY      HYSTEROSCOPIC POLYPECTOMY OF UTERUS N/A 2020    Procedure: POLYPECTOMY, UTERUS, HYSTEROSCOPIC;  Surgeon: Rashel Mccoy MD;  Location: [facility] OR;  Service: OB/GYN;  Laterality: N/A;    HYSTEROSCOPIC SURGICAL PROCEDURE N/A 2020    Procedure: HYSTEROSCOPY, THERAPEUTIC;  Surgeon: Rashel Mccoy MD;  Location: [facility] OR;  Service: OB/GYN;  Laterality: N/A;  myosure    HYSTEROSCOPY WITH DILATION AND CURETTAGE OF UTERUS N/A 2020    Procedure: HYSTEROSCOPY, WITH DILATION AND CURETTAGE OF UTERUS;  Surgeon: Rashel Mccoy MD;  Location: [facility] OR;  Service: OB/GYN;  Laterality: N/A;    SALPINGECTOMY      Right sided for ectopic pregnancy    THERMAL ABLATION OF ENDOMETRIUM USING HYSTEROSCOPY N/A 2020    Procedure: ABLATION, ENDOMETRIUM, THERMAL, HYSTEROSCOPIC;  Surgeon: Rashel Mccoy MD;  Location: [facility] OR;  Service: OB/GYN;  Laterality: N/A;    TUBAL LIGATION       Family History   Problem Relation Name Age of Onset    Hypertension Mother      Breast cancer Maternal Aunt      Diabetes Maternal Grandfather      Heart attack Paternal Grandfather       Social History     Socioeconomic History    Marital status:    Tobacco Use    Smoking status: Former    Smokeless tobacco: Never   Substance and Sexual Activity    Alcohol use: No    Drug use: Never    Sexual activity: Yes     Partners: Male     Birth control/protection: See Surgical  Hx   Social History Narrative    Plans from [facility] [employer]        GYN Visit & F/U4/8/2013     Postoperative and infection.  Return to clinic in 10 days to check incision.  Cipro 500 mg 1 by mouth twice a day #14     GYN Visit & F/U4/4/2013     Postoperative and infection apparent seroma.  Awaiting cultures.  Shower with Hibiclens daily at bedtime.  Return to clinic in one week.     GYN Visit & F/U4/3/2013     Postoperative infection: A&A cultures performed.  Ultrasound of abdominal incision to ensure no large subcutaneous abscess     GYN Exam (Annual/6Wk PP)104/1/2013     6 weeks postpartum visit.  Chronic hypertension.  Wound infection.  Obesity.    Return to clinic on 4/25/13 to recheck blood pressure.  Keep blood pressure log book.  Hold Procardia and propranolol for now.  Diet and exercise.     GYN Visit & F/U3/25/2013     Postoperative and infection.  Migraines.  Hypertension.    Will try to discontinue Procardia and started patient on propanolol.  Hold Procardia for now.  Refill on Bactrim given.  Return to clinic in one week.  Patient to keep blood pressure log book.     GYN Visit & F/U3/18/2013     Postpartal migraines.  Wound abscess.    Culture performed.  Neurology consult.  Continue Procardia for now  Return to clinic in 1 week to check incision.  Prescription for Bactrim DS #14.     GYN Visit & F/U3/13/2013     co ha w visual distrubances...sp cs 3 wk, w pp suspected pih..on valuim and procardia....sp lasiz x 2 d....0-tender over left frontal sinus....inc cl..dtr 3/3  ..legs 2+ edema.    a-pih    p-admit.     GYN Visit & F/U3/12/2013     Possible postpartum preeclampsia.  Prescription for Valium 10 mg every 8 hours, #20 prescription for Procardia 60 mg XL 1 by mouth twice a day #60.  Prescription for Lasix 40 mg by mouth daily.  Return to clinic in 1 week to check blood pressure.  PIH precautions given.     GYN Visit & F/U3/8/2013     Postpartal gestational hypertension.  Postpartal edema.    SMA-7 today  to check.  Creatinine and potassium.  Return to clinic on Tuesday to check blood pressure.  Continue Lasix for now.     GYN Visit & F/U3/1/2013     Postop.  Postpartal edema.  Return to clinic in 1 week to check edema and blood pressure.     GYN Visit & F/U2/26/2013     Edema.  Postop.    Increase by mouth hydration.  Will hold Lasix for now secondary to breast-feeding.  We'll watch for rash patient is returning to clinic for circumcision on Thursday, and will follow up with blood pressure and check edema.     OB Visit 20162/18/2013     See OB chart     OB Visit 20162/14/2013     See OB chart     OB Visit 20162/11/2013     See OB chart     OB Visit 20162/5/2013     See OB chart     OB Visit 20161/28/2013     See OB chart     OB Visit 20161/14/2013     See OB chart     OB Visit 20161/7/2013     See OB chart     OB Visit 201612/17/2012     See OB chart     OB Visit 201612/6/2012     See OB chart     OB Visit 201611/6/2012     See OB chart     OB Visit 201610/22/2012     See OB chart     OB Visit 20169/13/2012     See OB chart     OB Admit 108/9/2012     10/1 weeks gestation    Pelvic/ob bloodwork done        Plan:    Return for follow up appointment in 5 weeks for follow up ob and Serum part II     OB Appt7/24/2012     Return for follow up appointment 2-3 weeks for pelvic/bloodwork.    Also schedule paperwork appt.     OB Appt7/16/2012     threat ab vs early iup    ho ep    hcg 4.2k/5.9k, prog19    us fht pos edc 3/7 correlates w lmp edc 3/6     OB Appt7/9/2012     Beta HCG and Progesterone today.    Repeat beta in 48hrs.    If levels questionable, [place] [facility] [place] this week,    Return for follow up appointment one week to repeat TVUS.     GYN Exam (Annual/6Wk PP)103/29/2012     Return to clinic in a.m. for blood work to include CBC fasting blood sugar and lipid panel also will check TSH and prolactin secondary to galactorrhea.    Continue NuvaRing    Return to clinic in 1 year for annual    Pap of left breast  "fluid obtained       Current Outpatient Medications   Medication Sig Dispense Refill    aspirin 81 MG Chew Take 81 mg by mouth once daily.      busPIRone (BUSPAR) 10 MG tablet TAKE 1 TABLET (10 MG TOTAL) BY MOUTH 3 (THREE) TIMES DAILY. 90 tablet 11    diclofenac (VOLTAREN) 75 MG EC tablet Take 1 tablet (75 mg total) by mouth 2 (two) times daily. 60 tablet 0    empagliflozin (JARDIANCE) 10 mg tablet Take 1 tablet (10 mg total) by mouth once daily. 30 tablet 11    rosuvastatin (CRESTOR) 5 MG tablet TAKE ONE TABLET BY MOUTH NIGHTLY 90 tablet 3    tirzepatide 7.5 mg/0.5 mL PnIj Inject 7.5 mg into the skin every 7 days. 2 mL 1    tirzepatide (MOUNJARO) 5 mg/0.5 mL PnIj Inject 5 mg into the skin every 7 days. 6 mL 0     No current facility-administered medications for this visit.     Review of patient's allergies indicates:   Allergen Reactions    Fig Itching and Swelling    Meloxicam Hives and Itching       Review of Systems   Musculoskeletal:  Positive for arthralgias.   All other systems reviewed and are negative.      Objective:      Vitals:    06/05/24 0823   BP: (!) 136/90   BP Location: Left arm   Patient Position: Sitting   Pulse: 75   Weight: 113.4 kg (250 lb)   Height: 5' 6" (1.676 m)     Physical Exam  Vitals and nursing note reviewed.   Constitutional:       Appearance: Normal appearance.   Cardiovascular:      Pulses:           Dorsalis pedis pulses are 2+ on the right side and 2+ on the left side.        Posterior tibial pulses are 1+ on the right side and 1+ on the left side.   Pulmonary:      Effort: Pulmonary effort is normal.   Musculoskeletal:         General: Tenderness present.        Feet:    Feet:      Right foot:      Protective Sensation: 3 sites tested.  3 sites sensed.      Skin integrity: Erythema and warmth present.      Left foot:      Protective Sensation: 3 sites tested.  3 sites sensed.   Skin:     Capillary Refill: Capillary refill takes less than 2 seconds.      Findings: Erythema " present.   Neurological:      General: No focal deficit present.      Mental Status: She is alert.   Psychiatric:         Mood and Affect: Mood normal.         Behavior: Behavior normal.                                   Assessment:       1. Plantar fasciitis        Plan:         Patient presents today follow-up plantar fasciitis right foot.  Patient states she is doing considerably better at the very end of the day she has little bit of heel pain but she has not having the pain when she 1st gets out of bed in the morning like she was having previously she has been taking the diclofenac but and states it does help but she has only really been taking it once a day she states she can not remember to take it twice a day and just takes it in the morning.  Patient states it took several days to get used to the blue arch pads and took about one-week for her pain to significantly improve.  Patient is using an open back shoe with the small blue arch pad right foot at all times.  I did recommend increasing to a size medium arch pad I feel the patient could use a little bit bigger size to give her better support she was dispensed some blue arch pads she is going to start using these at all times I have also recommended the power step control arch supports women size 9 wide patient is going to look into purchasing these she will need these especially when she goes back into a closed in shoe in the fallen winter.  Recommended follow-up as needed patient advised as long as she is doing good she has not having pain or discomfort I will see her as needed I did advise her if any of her pain or symptoms return she starts having more discomfort she is to contact us immediately we may have to make some adjustments to the amount of arch that she has increasing the arch appropriately.This note was created using [facility] voice recognition software that occasionally misinterpreted phrases or words.

## (undated) DEVICE — KIT CANNISTER AQUILEX

## (undated) DEVICE — DEVICE MYOSURE REACH

## (undated) DEVICE — SYR BULB IRRIG ST 60 LF

## (undated) DEVICE — GLOVE GAMMEX 7 PF STERILE

## (undated) DEVICE — SCRUB HIBICLENS 4% CHG 4OZ

## (undated) DEVICE — DECANTER VIAL ASEPTIC TRANSFER

## (undated) DEVICE — SOL 9P NACL IRR PIC IL

## (undated) DEVICE — PACK DRAPE PERI/GYN TIBURON

## (undated) DEVICE — SEE MEDLINE ITEM 157181

## (undated) DEVICE — CATH 16FR URETHRL RED RUB

## (undated) DEVICE — GLOVE SURGEONS ULTRA TOUCH 6.5

## (undated) DEVICE — SYR B-D DISP CONTROL 10CC100/C

## (undated) DEVICE — TUBE AQUILEX INFLOW

## (undated) DEVICE — SEE MEDLINE ITEM 157116

## (undated) DEVICE — SEAL LENS SCOPE MYOSURE

## (undated) DEVICE — PAD SANITARY OB STERILE

## (undated) DEVICE — TUBE AQUILEX OUTFLOW

## (undated) DEVICE — TRAY DRY SKIN SCRUB PREP

## (undated) DEVICE — GAUZE SPONGE XRAY 4X4

## (undated) DEVICE — SPONGE NOVAPLUS LAP 18X18IN

## (undated) DEVICE — GLOVE SURG ULTRA TOUCH 7

## (undated) DEVICE — COVER SURG LIGHT HANDLE

## (undated) DEVICE — DRESSING TELFA PAD N ADH 2X3

## (undated) DEVICE — BRIEF MESH LARGE

## (undated) DEVICE — KIT DEV NOVASURE ENDOMETRIAL.

## (undated) DEVICE — NDL ECLIPSE SAFETY 18GX1-1/2IN